# Patient Record
(demographics unavailable — no encounter records)

---

## 2024-10-29 NOTE — PLAN
[FreeTextEntry1] : 1)  Self breast exam instructions discussed with the patient. 2)  Lipid profile assessment, TSH screening, fasting glucose testing, and vitamin D supplementation were discussed with the patient. 3)  Maintain healthy weight. 4)  Regular health maintenance with PCP. 5)  Remain tobacco free. 6)  Limit alcohol intake to less than 5 drinks per week. 7)  Osteoporosis screening and colonoscopy screening. 8)  Annual cholesterol screening. 9)  Annual influenza vaccine. 10) The importance of routine physical activity was reviewed and a goal of 150 minutes of moderately vigorous exercise per week was endorsed.   Yearly breast cancer screening with no current clinical or radiographic concerns. The patient was reminded regarding future well breast and general healthcare, breast cancer risk reduction, the importance of self-examination and the need for follow up. She was again reminded of the need to take Vit D3 2500 IU daily or to keep a Vit D level above 30, and Turmeric 1000 mg daily with Black Pepper. Plan continued yearly imaging and breast follow up, sooner as needed. I counseled the patient on current recommendations to reduce breast cancer risk including but not inclusive to regular exercise 20-30 minutes 3-4 times a week, low fat diet, limiting alcohol consumption, maintenance of ideal body weight, yearly imaging and self-breast awareness. Questions answered. I encouraged in light of COVID-19, social distancing, frequent hand washing and precautions to stay healthy.  Reviewed protocols for breast cancer remission.   I have spent 40 minutes of time on this encounter. Greater than 50% of the face-to-face encounter time was spent on counseling and/or coordination of care for examination findings, differential, testing, management and planning.

## 2024-10-29 NOTE — HISTORY OF PRESENT ILLNESS
[LMP unknown] : LMP unknown [perimenopausal] : perimenopausal [N] : Patient reports normal menses [Y] : Positive pregnancy history [No] : Patient does not have concerns regarding sex [Currently Active] : currently active [TextBox_4] : The 53-year-old patient presents today for a routine GYN exam. Patient is doing well. She offers no complaints. Patient went to the breast surgeon and oncologist in the last month and reports nothing abnormal. Patient currently taking Lupron injections and anastrozole for cancer remission and Zometa for bones. We reviewed together in detail her past medical and surgical histories, allergies and medication usage, social and family history. All questions were answered in easy-to-understand language. [Mammogramdate] : 09/22/2022 [BreastSonogramDate] : 09/22/2022 [PapSmeardate] : 09/13/2023 [BoneDensityDate] : 12/07/2022 [ColonoscopyDate] : 03/2024 [TextBox_43] : Verena  [TextBox_78] : No history of HPV [PGxTotal] : 1 [facility]

## 2024-10-29 NOTE — PHYSICAL EXAM
[Chaperone Present] : A chaperone was present in the examining room during all aspects of the physical examination [31524] : A chaperone was present during the pelvic exam. [Appropriately responsive] : appropriately responsive [Alert] : alert [No Acute Distress] : no acute distress [No Lymphadenopathy] : no lymphadenopathy [Soft] : soft [Non-tender] : non-tender [Non-distended] : non-distended [No HSM] : No HSM [No Lesions] : no lesions [No Mass] : no mass [Oriented x3] : oriented x3 [No Masses] : no breast masses were palpable [Right Breast Absent] : a total mastectomy [Left Breast Absent] : a total mastectomy [Labia Majora] : normal [Labia Minora] : normal [Uterine Adnexae] : normal [Normal rectal exam] : was normal [Normal Brown Stool] : was normal and brown [Normal] : was normal [None] : there was no rectal mass  [FreeTextEntry2] : Maegan Luciano [FreeTextEntry3] : This note was written by Maegan Luciano on 10/29/2024, acting as a scribe for Dr. Bry Rao MD. All medic record entries were at my, Dr. Bry Rao MD, direction and personally dictated by me in 10/29/2024. I have personally reviewed the chart and agree that the record accurately reflects my personal performance of the history, physical exam, assessment, and plan.  [Occult Blood Positive] : was negative for occult blood analysis [Internal Hemorrhoid] : no internal hemorrhoids were present [External Hemorrhoid] : no external hemorrhoids were present [Skin Tags] : no residual hemorrhoidal skin tags